# Patient Record
Sex: FEMALE
[De-identification: names, ages, dates, MRNs, and addresses within clinical notes are randomized per-mention and may not be internally consistent; named-entity substitution may affect disease eponyms.]

---

## 2022-05-17 ENCOUNTER — APPOINTMENT (OUTPATIENT)
Dept: PEDIATRIC ALLERGY IMMUNOLOGY | Facility: CLINIC | Age: 37
End: 2022-05-17
Payer: MEDICAID

## 2022-05-17 VITALS
BODY MASS INDEX: 21.52 KG/M2 | OXYGEN SATURATION: 98 % | HEIGHT: 63.39 IN | WEIGHT: 123 LBS | DIASTOLIC BLOOD PRESSURE: 75 MMHG | HEART RATE: 80 BPM | SYSTOLIC BLOOD PRESSURE: 108 MMHG

## 2022-05-17 DIAGNOSIS — Z78.9 OTHER SPECIFIED HEALTH STATUS: ICD-10-CM

## 2022-05-17 DIAGNOSIS — R21 RASH AND OTHER NONSPECIFIC SKIN ERUPTION: ICD-10-CM

## 2022-05-17 DIAGNOSIS — J30.1 ALLERGIC RHINITIS DUE TO POLLEN: ICD-10-CM

## 2022-05-17 DIAGNOSIS — L29.9 PRURITUS, UNSPECIFIED: ICD-10-CM

## 2022-05-17 PROBLEM — Z00.00 ENCOUNTER FOR PREVENTIVE HEALTH EXAMINATION: Status: ACTIVE | Noted: 2022-05-17

## 2022-05-17 PROCEDURE — 99205 OFFICE O/P NEW HI 60 MIN: CPT | Mod: 25

## 2022-05-17 PROCEDURE — 95004 PERQ TESTS W/ALRGNC XTRCS: CPT

## 2022-05-17 RX ORDER — LEVOCETIRIZINE DIHYDROCHLORIDE 5 MG/1
5 TABLET ORAL DAILY
Qty: 30 | Refills: 5 | Status: ACTIVE | COMMUNITY
Start: 2022-05-17 | End: 1900-01-01

## 2022-05-17 RX ORDER — TRIAMCINOLONE ACETONIDE 1 MG/G
0.1 CREAM TOPICAL TWICE DAILY
Qty: 1 | Refills: 2 | Status: ACTIVE | COMMUNITY
Start: 2022-05-17 | End: 1900-01-01

## 2022-05-17 RX ORDER — FLUTICASONE PROPIONATE 50 UG/1
50 SPRAY, METERED NASAL DAILY
Qty: 1 | Refills: 3 | Status: ACTIVE | COMMUNITY
Start: 2022-05-17 | End: 1900-01-01

## 2022-05-17 NOTE — HISTORY OF PRESENT ILLNESS
[Asthma] : asthma [Eczematous rashes] : eczematous rashes [Venom Reactions] : venom reactions [Food Allergies] : food allergies [de-identified] : Janki is a 37 yo F who presents for initial evaluation of a rash.\par Patient immigrated from [facility] about 1 year ago, [place] native [place] she had nasal congestion and sneezing, runny nose in spring, was diagnozed with seasonal allergies that haven't presented here yet. \par Rash: she developed itchy skin for about 3 weeks, only on neck, started with redness and progressed to thickening of skin and extreme pruritus. Patient associates this with food ingestion but she is not sure which foods. \par No other issues.

## 2022-05-17 NOTE — IMPRESSION
[Allergy Testing Weeds] : weeds [Allergy Testing Grasses] : grasses [Allergy Testing Dust Mite] : dust mites [Allergy Testing Mixed Feathers] : feathers [Allergy Testing Cockroach] : cockroach [Allergy Testing Dog] : dog [Allergy Testing Cat] : cat [Allergy Testing Trees] : trees [] : garlic

## 2022-05-17 NOTE — SOCIAL HISTORY
[House] : [unfilled] lives in a house  [Radiator/Baseboard] : heating provided by radiator(s)/baseboard(s) [Window Units] : air conditioning provided by window units [Cockroaches] : Patient states that there are cockroaches in the home [None] : none [Humidifier] : does not use a humidifier [Dehumidifier] : does not use a dehumidifier [Feather Pillows] : does not have feather pillows [Feather Comforter] : does not have a feather comforter [Bedroom] : not in the bedroom [Basement] : not in the basement [Living Area] : not in the living area [Smokers in Household] : there are no smokers in the home

## 2022-05-17 NOTE — REASON FOR VISIT
[Initial Consultation] : an initial consultation for [Allergy Evaluation/ Skin Testing] : allergy evaluation and or skin testing [Congestion] : congestion [Runny Nose] : runny nose [Rash] : rash

## 2022-05-17 NOTE — PHYSICAL EXAM
[Alert] : alert [Well Nourished] : well nourished [Healthy Appearance] : healthy appearance [No Acute Distress] : no acute distress [Well Developed] : well developed [Normal Pupil & Iris Size/Symmetry] : normal pupil and iris size and symmetry [No Discharge] : no discharge [No Photophobia] : no photophobia [Sclera Not Icteric] : sclera not icteric [Suborbital Bogginess] : suborbital bogginess (allergic shiners) [Normal TMs] : both tympanic membranes were normal [Normal Nasal Mucosa] : the nasal mucosa was normal [Normal Lips/Tongue] : the lips and tongue were normal [Normal Outer Ear/Nose] : the ears and nose were normal in appearance [Normal Tonsils] : normal tonsils [No Thrush] : no thrush [Boggy Nasal Turbinates] : boggy and/or pale nasal turbinates [Supple] : the neck was supple [Normal Rate and Effort] : normal respiratory rhythm and effort [No Crackles] : no crackles [No Retractions] : no retractions [Bilateral Audible Breath Sounds] : bilateral audible breath sounds [Normal Rate] : heart rate was normal  [Normal S1, S2] : normal S1 and S2 [Regular Rhythm] : with a regular rhythm [Soft] : abdomen soft [Not Tender] : non-tender [Not Distended] : not distended [No HSM] : no hepato-splenomegaly [Normal Cervical Lymph Nodes] : cervical [Skin Intact] : skin intact  [No Rash] : no rash [No Skin Lesions] : no skin lesions [Patches] : ~M patches present [No clubbing] : no clubbing [No Edema] : no edema [No Cyanosis] : no cyanosis [Alert, Awake, Oriented as Age-Appropriate] : alert, awake, oriented as age appropriate [Pale mucosa] : no pale mucosa [Wheezing] : no wheezing was heard [de-identified] : red, dry, skin on neck

## 2022-05-17 NOTE — REVIEW OF SYSTEMS
[Immunizations are up to date] : Immunizations are up to date [Pruritus] : pruritus [Dry Skin] : ~L dry skin [Nl] : Genitourinary [de-identified] : +rash on neck [Received Influenza Vaccine this Past Year] : patient has not received the Influenza vaccine this past year